# Patient Record
Sex: MALE | Race: WHITE | Employment: FULL TIME | URBAN - NONMETROPOLITAN AREA
[De-identification: names, ages, dates, MRNs, and addresses within clinical notes are randomized per-mention and may not be internally consistent; named-entity substitution may affect disease eponyms.]

---

## 2018-02-12 ENCOUNTER — OFFICE VISIT (OUTPATIENT)
Dept: OPTOMETRY | Age: 38
End: 2018-02-12
Payer: COMMERCIAL

## 2018-02-12 DIAGNOSIS — H52.13 MYOPIA OF BOTH EYES WITH ASTIGMATISM: Primary | ICD-10-CM

## 2018-02-12 DIAGNOSIS — H52.203 MYOPIA OF BOTH EYES WITH ASTIGMATISM: Primary | ICD-10-CM

## 2018-02-12 PROCEDURE — 92014 COMPRE OPH EXAM EST PT 1/>: CPT | Performed by: OPTOMETRIST

## 2018-02-12 PROCEDURE — 1036F TOBACCO NON-USER: CPT | Performed by: OPTOMETRIST

## 2018-02-12 PROCEDURE — G8421 BMI NOT CALCULATED: HCPCS | Performed by: OPTOMETRIST

## 2018-02-12 PROCEDURE — G8427 DOCREV CUR MEDS BY ELIG CLIN: HCPCS | Performed by: OPTOMETRIST

## 2018-02-12 RX ORDER — BENOXINATE HCL/FLUORESCEIN SOD 0.4%-0.25%
1 DROPS OPHTHALMIC (EYE) ONCE
Status: COMPLETED | OUTPATIENT
Start: 2018-02-12 | End: 2018-02-12

## 2018-02-12 RX ADMIN — Medication 1 DROP: at 15:19

## 2018-02-12 ASSESSMENT — REFRACTION_WEARINGRX
OS_CYLINDER: DS
OD_SPHERE: -2.50
OS_SPHERE: -2.50
OD_CYLINDER: DS

## 2018-02-12 ASSESSMENT — REFRACTION_CURRENTRX
OS_CYLINDER: DS
OS_BASECURVE: 8.7
OD_BRAND: VISTAKON: ACUVUE 2
OS_BRAND: VISTAKON: ACUVUE 2
OD_SPHERE: -2.50
OD_CYLINDER: DS
OD_BASECURVE: 8.7
OS_SPHERE: -2.50

## 2018-02-12 ASSESSMENT — TONOMETRY
IOP_METHOD: APPLANATION W FLURESS DROP
OS_IOP_MMHG: 19
OD_IOP_MMHG: 19

## 2018-02-12 ASSESSMENT — VISUAL ACUITY
METHOD: SNELLEN - LINEAR
OS_CC: 20/20
CORRECTION_TYPE: GLASSES

## 2018-02-12 ASSESSMENT — REFRACTION_MANIFEST
OS_CYLINDER: DS
OD_CYLINDER: DS
OS_SPHERE: -2.25
OD_SPHERE: -2.25

## 2018-02-12 ASSESSMENT — SLIT LAMP EXAM - LIDS
COMMENTS: NORMAL
COMMENTS: NORMAL

## 2018-02-12 NOTE — PROGRESS NOTES
Joaquina Morales presents today for   Chief Complaint   Patient presents with    Vision Exam   .    HPI     Last Vision Exam: 11/10/16  Last Ophthalmology Exam: None  Last Filled Glasses Rx: 11/10/16  Insurance: [employer]   Update: Contacts with glasses pick out at later date  On last pair and they are old  Has not worn them regularly for about a month   llikes the Acuvue 2                Main Ophthalmology Exam     External Exam       Right Left    External Normal Normal          Slit Lamp Exam       Right Left    Lids/Lashes Normal Normal    Conjunctiva/Sclera White and quiet White and quiet    Cornea Clear Clear    Anterior Chamber Deep and quiet Deep and quiet    Iris Round and reactive Round and reactive    Lens Clear Clear    Vitreous Normal Normal          Fundus Exam       Right Left    Disc Normal Normal    C/D Ratio 0.2 0.2    Macula Normal Normal    Vessels Normal Normal    78 diopter                   Tonometry     Tonometry (Applanation w Fluress drop, 3:23 PM)       Right Left    Pressure 19 19                  Visual Acuity (Snellen - Linear)       Right Left    Dist cc 20/20 20/20    Correction:  Glasses         Not recorded          Ophthalmology Exam     Wearing Rx       Sphere Cylinder    Right -2.50 ds    Left -2.50 ds                Manifest Refraction     Manifest Refraction       Sphere Cylinder Axis    Right -2.25 ds     Left -2.25 ds           Manifest Refraction #2 (Auto)       Sphere Cylinder Axis    Right -1.75 -0.25 099    Left -1.50 -0.50 087                 Final Rx       Sphere Cylinder    Right -2.25 ds    Left -2.25 ds    Type:  SVL    Expiration Date:  2/13/2020           Contact Lens Current Rx     Current Contact Lens Rx       Brand Base Curve Sphere Cylinder    Right [place]: Acuvue 2 8.7 -2.50 ds    Left [place]ak[place]: Acuvue 2 8.7 -2.50 ds                FINAL   Final Contact Lens Rx       Brand Base Curve Sphere Cylinder    Right [place]: Acuvue 2 8.7 -2.25 ds    Left

## 2018-04-04 ENCOUNTER — TELEPHONE (OUTPATIENT)
Dept: OPTOMETRY | Age: 38
End: 2018-04-04

## 2019-04-26 ENCOUNTER — OFFICE VISIT (OUTPATIENT)
Dept: FAMILY MEDICINE CLINIC | Age: 39
End: 2019-04-26
Payer: COMMERCIAL

## 2019-04-26 ENCOUNTER — HOSPITAL ENCOUNTER (OUTPATIENT)
Age: 39
Setting detail: SPECIMEN
Discharge: HOME | End: 2019-04-26
Payer: COMMERCIAL

## 2019-04-26 VITALS
HEIGHT: 70 IN | DIASTOLIC BLOOD PRESSURE: 84 MMHG | WEIGHT: 237 LBS | BODY MASS INDEX: 33.93 KG/M2 | HEART RATE: 83 BPM | OXYGEN SATURATION: 99 % | SYSTOLIC BLOOD PRESSURE: 134 MMHG

## 2019-04-26 DIAGNOSIS — Z00.00 WELLNESS EXAMINATION: ICD-10-CM

## 2019-04-26 DIAGNOSIS — Z00.00 WELLNESS EXAMINATION: Primary | ICD-10-CM

## 2019-04-26 DIAGNOSIS — Z30.09 STERILIZATION CONSULT: ICD-10-CM

## 2019-04-26 LAB
-: NORMAL
ABSOLUTE EOS #: 0.4 K/UL (ref 0–0.4)
ABSOLUTE IMMATURE GRANULOCYTE: NORMAL K/UL (ref 0–0.3)
ABSOLUTE LYMPH #: 2.2 K/UL (ref 1–4.8)
ABSOLUTE MONO #: 0.9 K/UL (ref 0.1–1.2)
ALBUMIN SERPL-MCNC: 4.7 G/DL (ref 3.5–5.2)
ALBUMIN/GLOBULIN RATIO: 1.6 (ref 1–2.5)
ALP BLD-CCNC: 43 U/L (ref 40–129)
ALT SERPL-CCNC: 44 U/L (ref 5–41)
AMORPHOUS: NORMAL
ANION GAP SERPL CALCULATED.3IONS-SCNC: 12 MMOL/L (ref 9–17)
AST SERPL-CCNC: 28 U/L
BACTERIA: NORMAL
BASOPHILS # BLD: 1 % (ref 0–2)
BASOPHILS ABSOLUTE: 0 K/UL (ref 0–0.2)
BILIRUB SERPL-MCNC: 0.68 MG/DL (ref 0.3–1.2)
BILIRUBIN URINE: NEGATIVE
BUN BLDV-MCNC: 20 MG/DL (ref 6–20)
BUN/CREAT BLD: 19 (ref 9–20)
CALCIUM SERPL-MCNC: 9.5 MG/DL (ref 8.6–10.4)
CASTS UA: NORMAL /LPF (ref 0–2)
CHLORIDE BLD-SCNC: 102 MMOL/L (ref 98–107)
CHOLESTEROL/HDL RATIO: 6.2
CHOLESTEROL: 218 MG/DL
CO2: 25 MMOL/L (ref 20–31)
COLOR: ABNORMAL
COMMENT UA: ABNORMAL
CREAT SERPL-MCNC: 1.06 MG/DL (ref 0.7–1.2)
CRYSTALS, UA: NORMAL /HPF
DIFFERENTIAL TYPE: NORMAL
EOSINOPHILS RELATIVE PERCENT: 5 % (ref 1–8)
EPITHELIAL CELLS UA: NORMAL /HPF (ref 0–5)
GFR AFRICAN AMERICAN: >60 ML/MIN
GFR NON-AFRICAN AMERICAN: >60 ML/MIN
GFR SERPL CREATININE-BSD FRML MDRD: ABNORMAL ML/MIN/{1.73_M2}
GFR SERPL CREATININE-BSD FRML MDRD: ABNORMAL ML/MIN/{1.73_M2}
GLUCOSE BLD-MCNC: 104 MG/DL (ref 70–99)
GLUCOSE URINE: NEGATIVE
HCT VFR BLD CALC: 45.3 % (ref 41–53)
HDLC SERPL-MCNC: 35 MG/DL
HEMOGLOBIN: 15.3 G/DL (ref 13.5–17.5)
IMMATURE GRANULOCYTES: NORMAL %
KETONES, URINE: NEGATIVE
LDL CHOLESTEROL DIRECT: 137 MG/DL
LDL CHOLESTEROL: ABNORMAL MG/DL (ref 0–130)
LEUKOCYTE ESTERASE, URINE: NEGATIVE
LYMPHOCYTES # BLD: 29 % (ref 15–43)
MCH RBC QN AUTO: 31.6 PG (ref 26–34)
MCHC RBC AUTO-ENTMCNC: 33.8 G/DL (ref 31–37)
MCV RBC AUTO: 93.5 FL (ref 80–100)
MONOCYTES # BLD: 12 % (ref 6–14)
MUCUS: NORMAL
NITRITE, URINE: NEGATIVE
NRBC AUTOMATED: NORMAL PER 100 WBC
OTHER OBSERVATIONS UA: NORMAL
PDW BLD-RTO: 13.5 % (ref 11–14.5)
PH UA: 6 (ref 5–6)
PLATELET # BLD: 210 K/UL (ref 140–450)
PLATELET ESTIMATE: NORMAL
PMV BLD AUTO: 9.8 FL (ref 6–12)
POTASSIUM SERPL-SCNC: 4.1 MMOL/L (ref 3.7–5.3)
PROTEIN UA: NEGATIVE
RBC # BLD: 4.85 M/UL (ref 4.5–5.9)
RBC # BLD: NORMAL 10*6/UL
RBC UA: NORMAL /HPF (ref 0–4)
RENAL EPITHELIAL, UA: NORMAL /HPF
SEG NEUTROPHILS: 53 % (ref 44–74)
SEGMENTED NEUTROPHILS ABSOLUTE COUNT: 4.1 K/UL (ref 1.8–7.7)
SODIUM BLD-SCNC: 139 MMOL/L (ref 135–144)
SPECIFIC GRAVITY UA: 1 (ref 1.01–1.02)
TOTAL PROTEIN: 7.7 G/DL (ref 6.4–8.3)
TRICHOMONAS: NORMAL
TRIGL SERPL-MCNC: 416 MG/DL
TURBIDITY: ABNORMAL
URINE HGB: NEGATIVE
UROBILINOGEN, URINE: NORMAL
VLDLC SERPL CALC-MCNC: ABNORMAL MG/DL (ref 1–30)
WBC # BLD: 7.7 K/UL (ref 3.5–11)
WBC # BLD: NORMAL 10*3/UL
WBC UA: NORMAL /HPF (ref 0–4)
YEAST: NORMAL

## 2019-04-26 PROCEDURE — 99395 PREV VISIT EST AGE 18-39: CPT | Performed by: FAMILY MEDICINE

## 2019-04-26 PROCEDURE — 36415 COLL VENOUS BLD VENIPUNCTURE: CPT

## 2019-04-26 PROCEDURE — 83721 ASSAY OF BLOOD LIPOPROTEIN: CPT

## 2019-04-26 PROCEDURE — 85025 COMPLETE CBC W/AUTO DIFF WBC: CPT

## 2019-04-26 PROCEDURE — 80053 COMPREHEN METABOLIC PANEL: CPT

## 2019-04-26 PROCEDURE — 81001 URINALYSIS AUTO W/SCOPE: CPT

## 2019-04-26 PROCEDURE — 80061 LIPID PANEL: CPT

## 2019-04-26 ASSESSMENT — PATIENT HEALTH QUESTIONNAIRE - PHQ9
2. FEELING DOWN, DEPRESSED OR HOPELESS: 0
SUM OF ALL RESPONSES TO PHQ9 QUESTIONS 1 & 2: 0
SUM OF ALL RESPONSES TO PHQ QUESTIONS 1-9: 0
1. LITTLE INTEREST OR PLEASURE IN DOING THINGS: 0
SUM OF ALL RESPONSES TO PHQ QUESTIONS 1-9: 0

## 2019-04-26 NOTE — PATIENT INSTRUCTIONS
(SPF 30 or higher) on any exposed skin. · See a dentist one or two times a year for checkups and to have your teeth cleaned. · Wear a seat belt in the car. · Drink alcohol in moderation, if at all. That means no more than 2 drinks a day for men and 1 drink a day for women. Follow your doctor's advice about when to have certain tests. These tests can spot problems early. For everyone  · Cholesterol. Have the fat (cholesterol) in your blood tested after age 21. Your doctor will tell you how often to have this done based on your age, family history, or other things that can increase your risk for heart disease. · Blood pressure. Have your blood pressure checked during a routine doctor visit. Your doctor will tell you how often to check your blood pressure based on your age, your blood pressure results, and other factors. · Vision. Talk with your doctor about how often to have a glaucoma test.  · Diabetes. Ask your doctor whether you should have tests for diabetes. · Colon cancer. Have a test for colon cancer at age 48. You may have one of several tests. If you are younger than 48, you may need a test earlier if you have any risk factors. Risk factors include whether you already had a precancerous polyp removed from your colon or whether your parent, brother, sister, or child has had colon cancer. For women  · Breast exam and mammogram. Talk to your doctor about when you should have a clinical breast exam and a mammogram. Medical experts differ on whether and how often women under 50 should have these tests. Your doctor can help you decide what is right for you. · Pap test and pelvic exam. Begin Pap tests at age 24. A Pap test is the best way to find cervical cancer. The test often is part of a pelvic exam. Ask how often to have this test.  · Tests for sexually transmitted infections (STIs). Ask whether you should have tests for STIs.  You may be at risk if you have sex with more than one person, especially if your partners do not wear condoms. For men  · Tests for sexually transmitted infections (STIs). Ask whether you should have tests for STIs. You may be at risk if you have sex with more than one person, especially if you do not wear a condom. · Testicular cancer exam. Ask your doctor whether you should check your testicles regularly. · Prostate exam. Talk to your doctor about whether you should have a blood test (called a PSA test) for prostate cancer. Experts differ on whether and when men should have this test. Some experts suggest it if you are older than 39 and are -American or have a father or brother who got prostate cancer when he was younger than 72. When should you call for help? Watch closely for changes in your health, and be sure to contact your doctor if you have any problems or symptoms that concern you. Where can you learn more? Go to https://chflakoeb.healthGamblit Gamingpartners. org and sign in to your [employer]ix account. Enter P072 in the StorPool box to learn more about \"Well Visit, Ages 25 to 48: Care Instructions. \"     If you do not have an account, please click on the \"Sign Up Now\" link. Current as of: March 28, 2018  Content Version: 11.9  © 2911-0162 [employer], [employer]. Care instructions adapted under license by [facility] ([facility]). If you have questions about a medical condition or this instruction, always ask your healthcare professional. [place] [address] any warranty or liability for your use of this information. Patient Education        Eating Healthy Foods: Care Instructions  Your Care Instructions    Eating healthy foods can help lower your risk for disease. Healthy food gives you energy and keeps your heart strong, your brain active, your muscles working, and your bones strong. A healthy diet includes a variety of foods from the basic food groups: grains, vegetables, fruits, milk and milk products, and meat and beans.  Some people may eat more of their favorite foods from only one food group and, as a result, miss getting the nutrients they need. So, it is important to pay attention not only to what you eat but also to what you are missing from your diet. You can eat a healthy, balanced diet by making a few small changes. Follow-up care is a key part of your treatment and safety. Be sure to make and go to all appointments, and call your doctor if you are having problems. It's also a good idea to know your test results and keep a list of the medicines you take. How can you care for yourself at home? Look at what you eat  · Keep a food diary for a week or two and record everything you eat or drink. Track the number of servings you eat from each food group. · For a balanced diet every day, eat a variety of:  ? 6 or more ounce-equivalents of grains, such as cereals, breads, crackers, rice, or pasta, every day. An ounce-equivalent is 1 slice of bread, 1 cup of ready-to-eat cereal, or ½ cup of cooked rice, cooked pasta, or cooked cereal.  ? 2½ cups of vegetables, especially:  § Dark-green vegetables such as broccoli and spinach. § Orange vegetables such as carrots and sweet potatoes. § Dry beans (such as son and kidney beans) and peas (such as lentils). ? 2 cups of fresh, frozen, or canned fruit. A small apple or 1 banana or orange equals 1 cup. ? 3 cups of nonfat or low-fat milk, yogurt, or other milk products. ? 5½ ounces of meat and beans, such as chicken, fish, lean meat, beans, nuts, and seeds. One egg, 1 tablespoon of peanut butter, ½ ounce nuts or seeds, or ¼ cup of cooked beans equals 1 ounce of meat. · Learn how to read food labels for serving sizes and ingredients. Fast-food and convenience-food meals often contain few or no fruits or vegetables. Make sure you eat some fruits and vegetables to make the meal more nutritious. · Look at your food diary.  For each food group, add up what you have eaten and then divide the total by the number juice intake to 4 to 6 oz (½ to ¾ cup) a day. · Blend low-fat yogurt, fruit juice, and canned or frozen fruit to make a smoothie for breakfast or a snack. Where can you learn more? Go to https://salma.health-partners. org and sign in to your Digital Health Dialog account. Enter Z564 in the [employer] box to learn more about \"Eating Healthy Foods: Care Instructions. \"     If you do not have an account, please click on the \"Sign Up Now\" link. Current as of: March 28, 2018  Content Version: 11.9  © 3957-6661 [employer]. Care instructions adapted under license by [facility] ([facility]). If you have questions about a medical condition or this instruction, always ask your healthcare professional. [address] any warranty or liability for your use of this information. Patient Education        Vasectomy: Care Instructions  Your Care Instructions    A vasectomy is an operation to make a man sterile, or not able to make a woman pregnant. During a vasectomy, a doctor cuts or blocks the tubes, called the vas deferens, that carry sperm from the testicles to the penis. This keeps sperm from reaching a woman's egg to make a baby when ejaculation occurs during sex. A vasectomy is a simple procedure that can be done at your doctor's office or clinic. A vasectomy will not change your ability to have sex or your sex drive. You will still be able to enjoy sex in the same way as before. You will still produce normal amounts of semen when you climax. The only difference is that the semen will not contain sperm. Vasectomy is considered a permanent method of birth control. You should only consider a vasectomy if you have completed your family or are sure that you do not want children. Follow-up care is a key part of your treatment and safety. Be sure to make and go to all appointments, and call your doctor if you are having problems.  It's also a good idea to know your test results and keep a list of the medicines you take. What happens during a vasectomy? · Your genital area will be washed with soap that kills germs. The area may be shaved. · You may be given medicine to relax you and make you sleepy. · You will get a shot to numb the area at the scrotum, where your doctor will make one or two small cuts. The scrotum is the skin sac that holds the testicles. · The doctor reaches the tube, which is just under the skin, through the small cut. The doctor seals, ties, or cuts each tube. The small cuts may be stitched closed. If you had a no-scalpel vasectomy, you may not have stitches at all. · The procedure usually takes less than 30 minutes. You can expect some swelling and minor pain for several days. You will be given instructions after the procedure. Why might you choose a vasectomy? · You do not want to have a child in the future. Vasectomy is considered a permanent procedure. · Vasectomy is a very effective form of birth control. (But it is not 100% effective.)  · The one-time cost of a vasectomy may be less than the ongoing cost of other methods, such as birth control pills or condoms. · You want to spare your partner from having a tubal sterilization. Sterilization for women involves surgery, has more risks, and is more expensive. Why would you choose not to have a vasectomy? · You want to avoid surgery and the risks that come with surgery. · Vasectomy is permanent. Some men may not want to limit their option to have children in case of possible life changes such as divorce. · Having a vasectomy may slightly increase your risk for prostate cancer. · You may have other reasons. These may include your Jewish beliefs or what your partner wants. When should you call for help? Be sure to contact your doctor if:    · You need more information about vasectomy.     · You want to have a vasectomy. Where can you learn more? Go to https://chrossy.health-partners. org and sign in to your Sarenzat account. Enter A735 in the [facility] box to learn more about \"Vasectomy: Care Instructions. \"     If you do not have an account, please click on the \"Sign Up Now\" link. Current as of: September 26, 2018  Content Version: 11.9  © 7357-5289 [employer]. Care instructions adapted under license by [facility] ([facility]). If you have questions about a medical condition or this instruction, always ask your healthcare professional. [address] any warranty or liability for your use of this information.

## 2019-04-26 NOTE — PROGRESS NOTES
History and Physical      Nemesio Mendoza  YOB: 1980    Date of Service:  4/26/2019    Chief Complaint:   Nemesio Mendoza is a 45 y.o. male who presents for complete physical examination.     HPI: has been improving diet     Wt Readings from Last 3 Encounters:   04/26/19 237 lb (107.5 kg)   06/10/15 220 lb (99.8 kg)   04/13/05 183 lb 9.6 oz (83.3 kg)     BP Readings from Last 3 Encounters:   04/26/19 134/84   06/10/15 (!) 134/92   04/13/05 124/68       Patient Active Problem List   Diagnosis    Myopia of both eyes with astigmatism       Preventive Care:  Health Maintenance   Topic Date Due    Varicella Vaccine (1 of 2 - 13+ 2-dose series) 09/27/1993    HIV screen  09/27/1995    DTaP/Tdap/Td vaccine (5 - Tdap) 09/27/1999    Flu vaccine (Season Ended) 09/01/2019    Pneumococcal 0-64 years Vaccine  Aged Out      Self-testicular exams: Yes  Sexual activity: has sex with females   Last eye exam: 2018, normal  Exercise: light free weights and yoga  Seatbelt use: yes  Lipid panel:  No results found for: CHOL, TRIG, HDL, LDLCALC, LDLDIRECT    Living will: no,   additional information provided reviewed     Immunization History   Administered Date(s) Administered    DTaP 01/26/1981, 03/25/1981, 07/22/1981, 07/30/1982    IPV (Ipol) 01/26/1981, 03/25/1981, 07/22/1981, 07/30/1982, 07/18/1986    MMR 02/19/1982       Allergies   Allergen Reactions    Augmentin [Amoxicillin-Pot Clavulanate]      No outpatient medications have been marked as taking for the 4/26/19 encounter (Office Visit) with Petar Byrd MD.       Past Medical History:   Diagnosis Date    Chronic constipation     Low back pain     history of     Myofascitis     history of    Neck strain     history of    Plantar fasciitis     history of    Rotator cuff strain     right, history of    Simple myopia     both eyes     Past Surgical History:   Procedure Laterality Date    LAPAROSCOPIC APPENDECTOMY  08/30/2002     Family History   Problem Relation Age of Onset    Hypertension Maternal Grandmother     Glaucoma Maternal Grandmother     Glaucoma Other         mom's side    Glaucoma Mother      Social History     Socioeconomic History    Marital status:      Spouse name: Not on file    Number of children: Not on file    Years of education: Not on file    Highest education level: Not on file   Occupational History    Not on file   Social Needs    Financial resource strain: Not on file    Food insecurity:     Worry: Not on file     Inability: Not on file    Transportation needs:     Medical: Not on file     Non-medical: Not on file   Tobacco Use    Smoking status: Former Smoker   Substance and Sexual Activity    Alcohol use: Not on file    Drug use: Not on file    Sexual activity: Not on file   Lifestyle    Physical activity:     Days per week: Not on file     Minutes per session: Not on file    Stress: Not on file   Relationships    Social connections:     Talks on phone: Not on file     Gets together: Not on file     Attends Christian service: Not on file     Active member of club or organization: Not on file     Attends meetings of clubs or organizations: Not on file     Relationship status: Not on file    Intimate partner violence:     Fear of current or ex partner: Not on file     Emotionally abused: Not on file     Physically abused: Not on file     Forced sexual activity: Not on file   Other Topics Concern    Not on file   Social History Narrative    Not on file       Review of Systems:  Currently doing well     Physical Exam:   Vitals:    04/26/19 1052   BP: 134/84   Site: Left Upper Arm   Position: Sitting   Pulse: 83   SpO2: 99%   Weight: 237 lb (107.5 kg)   Height: 5' 9.5\" (1.765 m)     Body mass index is 34.5 kg/m². Constitutional: He is oriented to person, place, and time. He appears well-developed and well-nourished. No distress. HEENT:   Head: Normocephalic and atraumatic.    Right Ear: Tympanic membrane, external ear and ear canal normal.   Left Ear: Tympanic membrane, external ear and ear canal normal.   Nose: Nose normal.   Mouth/Throat: Oropharynx is clear and moist and mucous membranes are normal. No oropharyngeal exudate or posterior oropharyngeal erythema. He has no cervical adenopathy. Eyes: Conjunctivae and extraocular motions are normal. Pupils are equal, round, and reactive to light. Neck: Full passive range of motion without pain. Neck supple. No JVD present. Carotid bruit is not present. No mass and no thyromegaly present. Cardiovascular: Normal rate, regular rhythm, normal heart sounds and intact distal pulses. Exam reveals no gallop and no friction rub. No murmur heard. Pulmonary/Chest: Effort normal and breath sounds normal. No respiratory distress. He has no wheezes, rhonchi or rales. Abdominal: Soft, non-tender. Bowel sounds and aorta are normal. There is no organomegaly, mass or bruit. Genitourinary:  Deferred . Musculoskeletal: Normal range of motion, no synovitis. He exhibits no edema. Neurological: He is alert and oriented to person, place, and time. He has normal reflexes. No cranial nerve deficit. Coordination normal.   Skin: Skin is warm, dry and intact. No suspicious lesions are noted. Psychiatric: He has a normal mood and affect.  His speech is normal and behavior is normal. Judgment, cognition and memory are normal.     Assessment/Plan:    Obese  Hyperlipidemia      Labs per orders  Refused hiv   Desires vasectomy   Discussed immunizations   Advanced directives given and reviewed  Healthy diet and fitness discussed  Vitamin d 3 2000 iu a day  Did not tolerate lipitor  Further recommendations depending on labs results  As long as well follow up 1 year

## 2019-05-07 ENCOUNTER — OFFICE VISIT (OUTPATIENT)
Dept: OPTOMETRY | Age: 39
End: 2019-05-07
Payer: COMMERCIAL

## 2019-05-07 DIAGNOSIS — H52.13 MYOPIA OF BOTH EYES WITH ASTIGMATISM: Primary | ICD-10-CM

## 2019-05-07 DIAGNOSIS — H52.203 MYOPIA OF BOTH EYES WITH ASTIGMATISM: Primary | ICD-10-CM

## 2019-05-07 PROCEDURE — 92014 COMPRE OPH EXAM EST PT 1/>: CPT | Performed by: OPTOMETRIST

## 2019-05-07 ASSESSMENT — REFRACTION_CURRENTRX
OD_BRAND: VISTAKON: ACUVUE 2
OD_BASECURVE: 8.7
OS_CYLINDER: DS
OS_SPHERE: -2.25
OS_BASECURVE: 8.7
OS_BRAND: VISTAKON: ACUVUE 2
OD_SPHERE: -2.25
OD_CYLINDER: DS

## 2019-05-07 ASSESSMENT — KERATOMETRY
OS_AXISANGLE2_DEGREES: 130
OS_K1POWER_DIOPTERS: 43.75
OS_AXISANGLE_DEGREES: 040
OS_K2POWER_DIOPTERS: 44.25
METHOD_AUTO_MANUAL: AUTOMATED

## 2019-05-07 ASSESSMENT — REFRACTION_MANIFEST
OD_CYLINDER: DS
OS_CYLINDER: DS
OS_SPHERE: -2.25
OD_SPHERE: -2.25

## 2019-05-07 ASSESSMENT — REFRACTION_WEARINGRX
OD_CYLINDER: DS
OS_SPHERE: -2.25
SPECS_TYPE: SVL: NOT FILLED
OS_CYLINDER: DS
OD_SPHERE: -2.25

## 2019-05-07 ASSESSMENT — TONOMETRY
OD_IOP_MMHG: 16
IOP_METHOD: NON-CONTACT AIR PUFF
OS_IOP_MMHG: 24

## 2019-05-07 ASSESSMENT — VISUAL ACUITY
CORRECTION_TYPE: CONTACTS
METHOD: SNELLEN - LINEAR
OS_CC: 20/20

## 2019-05-07 ASSESSMENT — SLIT LAMP EXAM - LIDS
COMMENTS: NORMAL
COMMENTS: NORMAL

## 2019-05-07 NOTE — PROGRESS NOTES
Honorio Zafar presents today for   Chief Complaint   Patient presents with    Vision Exam   .    HPI     Last Vision Exam: 2/12/2018 AW  Last Ophthalmology Exam: n/a  Last Filled Glasses Rx: ? Insurance: [employer]  Update: Contacts  No changes in vision  States on last pair of contacts; and think the one has a slight tear in it.                    Family History   Problem Relation Age of Onset    Hypertension Maternal Grandmother     Glaucoma Maternal Grandmother     Glaucoma Other         mom's side    Glaucoma Mother        Social History     Socioeconomic History    Marital status:      Spouse name: None    Number of children: None    Years of education: None    Highest education level: None   Occupational History    None   Social Needs    Financial resource strain: None    Food insecurity:     Worry: None     Inability: None    Transportation needs:     Medical: None     Non-medical: None   Tobacco Use    Smoking status: Former Smoker    Smokeless tobacco: Never Used   Substance and Sexual Activity    Alcohol use: None    Drug use: None    Sexual activity: None   Lifestyle    Physical activity:     Days per week: None     Minutes per session: None    Stress: None   Relationships    Social connections:     Talks on phone: None     Gets together: None     Attends Islam service: None     Active member of club or organization: None     Attends meetings of clubs or organizations: None     Relationship status: None    Intimate partner violence:     Fear of current or ex partner: None     Emotionally abused: None     Physically abused: None     Forced sexual activity: None   Other Topics Concern    None   Social History Narrative    None     Past Medical History:   Diagnosis Date    Chronic constipation     Low back pain     history of     Myofascitis     history of    Neck strain     history of    Plantar fasciitis     history of    Rotator cuff strain     right, history of  Simple myopia     both eyes       Neuro/Psych     Neuro/Psych     Oriented x3:  Yes    Mood/Affect:  Normal                Main Ophthalmology Exam     External Exam       Right Left    External Normal Normal          Slit Lamp Exam       Right Left    Lids/Lashes Normal Normal    Conjunctiva/Sclera White and quiet White and quiet    Cornea Clear Clear    Anterior Chamber Deep and quiet Deep and quiet    Iris Round and reactive Round and reactive    Lens Clear; trace psc  Clear; trace psc     Vitreous Normal Normal          Fundus Exam       Right Left    Disc Normal Normal    C/D Ratio 0.2 0.2    Macula Normal Normal    Vessels Normal Normal    78 diopter                   Tonometry     Tonometry (Non-contact air puff, 9:11 AM)       Right Left    Pressure 16 24   IOP.1              21.4  CH:  11.4          7.9  WS: 6.5          3.8                     Visual Acuity (Snellen - Linear)       Right Left    Dist cc 20/20 20/20    Correction:  Contacts        Keratometry     Keratometry (Automated)       K1 Axis K2 Axis    Right        Left 43.75 130 44.25 040                Ophthalmology Exam     Wearing Rx       Sphere Cylinder    Right -2.25 ds    Left -2.25 ds    Age:  1yr    Type:  SVL: NOT FILLED                Manifest Refraction     Manifest Refraction       Sphere Cylinder Axis    Right -2.25 ds     Left -2.25 ds           Manifest Refraction #2 (Auto)       Sphere Cylinder Axis    Right -1.75 -0.25 031    Left -1.75 -0.25 101                 Final Rx       Sphere Cylinder    Right -2.25 ds    Left -2.25 ds    Type:  SVL    Expiration Date:  2021           Contact Lens Current Rx     Current Contact Lens Rx (Ordered)       Brand Base Curve Sphere Cylinder    Right [place]: Acuvue 2 8.7 -2.25 ds    Left [place]ist[place]: Acuvue 2 8.7 -2.25 ds                FINAL   Final Contact Lens Rx       Brand Base Curve Sphere Cylinder    Right [place]: Acuvue 2 8.7 -2.25 ds    Left [place]istakon: Acuvue 2 8.7 -2.25 ds Expiration Date:  5/7/2020    Wearing Schedule:  Daily wear   Final           Plan:     1.  Myopia of both eyes with astigmatism             Patient Instructions   4 boxes will be ordered and one set of tirals given      Return in about 1 year (around 5/7/2020) for complete eye exam.

## 2020-09-29 ENCOUNTER — OFFICE VISIT (OUTPATIENT)
Dept: FAMILY MEDICINE CLINIC | Age: 40
End: 2020-09-29
Payer: COMMERCIAL

## 2020-09-29 VITALS
WEIGHT: 231 LBS | HEIGHT: 70 IN | DIASTOLIC BLOOD PRESSURE: 84 MMHG | HEART RATE: 84 BPM | BODY MASS INDEX: 33.07 KG/M2 | RESPIRATION RATE: 20 BRPM | SYSTOLIC BLOOD PRESSURE: 122 MMHG

## 2020-09-29 LAB
CHOLESTEROL/HDL RATIO: 5.11 RATIO (ref 0–4.5)
CHOLESTEROL: 230 MG/DL (ref 50–200)
GLUCOSE: 90 MG/DL (ref 75–110)
HDLC SERPL-MCNC: 45 MG/DL (ref 36–68)
LDL CHOLESTEROL CALCULATED: 116.4 MG/DL (ref 0–160)
TRIGL SERPL-MCNC: 343 MG/DL (ref 10–250)
VLDLC SERPL CALC-MCNC: 68.6 MG/DL (ref 0–50)

## 2020-09-29 PROCEDURE — 99396 PREV VISIT EST AGE 40-64: CPT | Performed by: NURSE PRACTITIONER

## 2020-09-29 ASSESSMENT — PATIENT HEALTH QUESTIONNAIRE - PHQ9
SUM OF ALL RESPONSES TO PHQ QUESTIONS 1-9: 0
SUM OF ALL RESPONSES TO PHQ9 QUESTIONS 1 & 2: 0
1. LITTLE INTEREST OR PLEASURE IN DOING THINGS: 0
SUM OF ALL RESPONSES TO PHQ QUESTIONS 1-9: 0
2. FEELING DOWN, DEPRESSED OR HOPELESS: 0

## 2020-10-02 ENCOUNTER — TELEPHONE (OUTPATIENT)
Dept: FAMILY MEDICINE CLINIC | Age: 40
End: 2020-10-02

## 2020-10-02 PROBLEM — Z00.00 PREVENTATIVE HEALTH CARE: Status: ACTIVE | Noted: 2020-10-02

## 2020-10-02 PROBLEM — E78.2 ELEVATED CHOLESTEROL WITH ELEVATED TRIGLYCERIDES: Status: ACTIVE | Noted: 2020-10-02

## 2020-10-02 ASSESSMENT — ENCOUNTER SYMPTOMS
BACK PAIN: 0
WHEEZING: 0
CHEST TIGHTNESS: 0
SORE THROAT: 0
COUGH: 0
ABDOMINAL PAIN: 0
SHORTNESS OF BREATH: 0

## 2020-10-02 NOTE — TELEPHONE ENCOUNTER
Noted in his chart that he previously did not tolerate Lipitor. I would like him to start taking fish oil 2 capsules daily distantly for his triglycerides. I also recommend that he avoid alcohol and sugars.   Recheck of triglycerides in 6 months

## 2020-10-02 NOTE — PROGRESS NOTES
[facility] [address]  [address] [address]  Dept: [phone]  Dept Fax: [phone]  Loc: [phone]     Visit Date:  9/29/2020    Patient:  Nael Herbert  YOB: 1980    HPI:     Chief Complaint   Patient presents with    Employment Physical     annual health screening       HPI  Here for preventative wellness visit. History of elevated triglycerides without wishing to continue Lipitor. Did not tolerate Lipitor. Medications  Prior to Visit Medications    Not on File        The patient is allergic to augmentin [amoxicillin-pot clavulanate]. Past Medical History  Jonathon Rivera  has a past medical history of Chronic constipation, Low back pain, Myofascitis, Neck strain, Plantar fasciitis, Rotator cuff strain, and Simple myopia. Past Surgical History  The patient  has a past surgical history that includes laparoscopic appendectomy (08/30/2002). Family History  This patient's family history includes Glaucoma in his maternal grandmother, mother, and another family member; Heart Attack in his father; Heart Disease in his father; Hypertension in his maternal grandmother. Social History  Jonathon Rivera  reports that he has quit smoking. He has never used smokeless tobacco.    Health Maintenance:    Health Maintenance   Topic Date Due    HIV screen  09/27/1995    DTaP/Tdap/Td vaccine (5 - Tdap) 09/27/1999    Flu vaccine (1) 09/01/2020    Varicella vaccine (1 of 2 - 2-dose childhood series) 10/20/2020 (Originally 9/27/1981)    Lipid screen  09/29/2025    Hepatitis A vaccine  Aged Out    Hepatitis B vaccine  Aged Out    Hib vaccine  Aged Out    Meningococcal (ACWY) vaccine  Aged Out    Pneumococcal 0-64 years Vaccine  Aged Out       Subjective:      Review of Systems   Constitutional: Negative for chills, fatigue and fever. HENT: Negative for congestion and sore throat. Psychiatric:         Attention and Perception: Attention normal.         Mood and Affect: Mood normal.         Behavior: Behavior normal. Behavior is cooperative. Judgment: Judgment normal.         Labs Reviewed 9/29/2020:    Lab Results   Component Value Date    WBC 7.7 04/26/2019    HGB 15.3 04/26/2019    HCT 45.3 04/26/2019     04/26/2019    CHOL 230 (H) 09/29/2020    TRIG 343 (H) 09/29/2020    HDL 45 09/29/2020    LDLDIRECT 137 (H) 04/26/2019    ALT 44 (H) 04/26/2019    AST 28 04/26/2019     04/26/2019    K 4.1 04/26/2019     04/26/2019    CREATININE 1.06 04/26/2019    BUN 20 04/26/2019    CO2 25 04/26/2019       Assessment/Plan      1. Preventative health care  Wellness and prevention discussed. Immunizations encouraged. Follow-up annually and as needed. 2. Elevated cholesterol with elevated triglycerides  I have reviewed historical vital signs, lab work, and most recent patient encounter. Discussion of healthful lifestyle, low-sodium diet, cardiovascular activity on a routine basis emphasized today, time allotted for questions and answer. Back in 6months with an office visit and labs to recheck triglycerides. New start fish oil 2 capsules daily please      Return in about 6 months (around 3/29/2021), or if symptoms worsen or fail to improve. Patient given educational materials - see patient instructions. Discussed use, benefit, and side effects of prescribed medications. All patient questions answered. Pt voiced understanding. Reviewed health maintenance.        Electronically signed JUAN Dodge CNP on 9/29/2020 at 2:16 PM

## 2020-11-01 PROBLEM — Z00.00 PREVENTATIVE HEALTH CARE: Status: RESOLVED | Noted: 2020-10-02 | Resolved: 2020-11-01

## 2021-05-13 ENCOUNTER — OFFICE VISIT (OUTPATIENT)
Dept: OPTOMETRY | Age: 41
End: 2021-05-13
Payer: COMMERCIAL

## 2021-05-13 DIAGNOSIS — H52.203 MYOPIA OF BOTH EYES WITH ASTIGMATISM: Primary | ICD-10-CM

## 2021-05-13 DIAGNOSIS — H53.8 BLURRED VISION, BILATERAL: ICD-10-CM

## 2021-05-13 DIAGNOSIS — H52.13 MYOPIA OF BOTH EYES WITH ASTIGMATISM: Primary | ICD-10-CM

## 2021-05-13 PROCEDURE — 92014 COMPRE OPH EXAM EST PT 1/>: CPT | Performed by: OPTOMETRIST

## 2021-05-13 ASSESSMENT — SLIT LAMP EXAM - LIDS
COMMENTS: NORMAL
COMMENTS: NORMAL

## 2021-05-13 ASSESSMENT — KERATOMETRY
OS_K1POWER_DIOPTERS: 44.00
OD_AXISANGLE2_DEGREES: 000
OD_K1POWER_DIOPTERS: 43.75
OS_AXISANGLE_DEGREES: 046
OS_K2POWER_DIOPTERS: 44.50
OD_AXISANGLE_DEGREES: 090
OD_K2POWER_DIOPTERS: 43.75
OS_AXISANGLE2_DEGREES: 136
METHOD_AUTO_MANUAL: AUTOMATED

## 2021-05-13 ASSESSMENT — REFRACTION_CURRENTRX
OD_BASECURVE: 8.7
OD_CYLINDER: DS
OD_SPHERE: -2.25
OS_BRAND: VISTAKON: ACUVUE 2
OS_SPHERE: -2.25
OD_BRAND: VISTAKON: ACUVUE 2
OS_CYLINDER: DS
OS_BASECURVE: 8.7

## 2021-05-13 ASSESSMENT — REFRACTION_WEARINGRX
OD_SPHERE: -2.25
OS_SPHERE: -2.25
OS_CYLINDER: DS
SPECS_TYPE: SVL
OD_CYLINDER: DS

## 2021-05-13 ASSESSMENT — REFRACTION_MANIFEST
OS_CYLINDER: DS
OD_CYLINDER: DS
OS_SPHERE: -2.25
OD_SPHERE: -2.25

## 2021-05-13 ASSESSMENT — TONOMETRY
IOP_METHOD: NON-CONTACT AIR PUFF
OS_IOP_MMHG: 22
OD_IOP_MMHG: 22

## 2021-05-13 ASSESSMENT — ENCOUNTER SYMPTOMS
RESPIRATORY NEGATIVE: 0
GASTROINTESTINAL NEGATIVE: 0
EYES NEGATIVE: 0
ALLERGIC/IMMUNOLOGIC NEGATIVE: 0

## 2021-05-13 ASSESSMENT — VISUAL ACUITY
CORRECTION_TYPE: CONTACTS
METHOD: SNELLEN - LINEAR
OS_CC: 20/25

## 2021-05-13 NOTE — PROGRESS NOTES
Urban Moreno presents today for   Chief Complaint   Patient presents with    Blurred Vision    Vision Exam   .    HPI     Blurred Vision     In both eyes. Vision is blurred. Context:  distance vision.               Comments     Last Vision Exam: 5/7/2019 Aw  Last Ophthalmology Exam: n/a  Last Filled Glasses Rx: 6/10/2019  Insurance: [employer]  Update: Contacts ; Glasses if needed  Distance is getting a little more blurry  Likes the Acuvue 2                    ROS     Negative for: Constitutional, Gastrointestinal, Neurological, Skin, Genitourinary, Musculoskeletal, HENT, Endocrine, Cardiovascular, Eyes, Respiratory, Psychiatric, Allergic/Imm, Heme/Lymph          Family History   Problem Relation Age of Onset    Hypertension Maternal Grandmother     Glaucoma Maternal Grandmother     Glaucoma Other         mom's side    Glaucoma Mother     Heart Disease Father     Heart Attack Father     Diabetes Neg Hx     Cataracts Neg Hx        Social History     Socioeconomic History    Marital status:      Spouse name: None    Number of children: None    Years of education: None    Highest education level: None   Occupational History    None   Social Needs    Financial resource strain: None    Food insecurity     Worry: None     Inability: None    Transportation needs     Medical: None     Non-medical: None   Tobacco Use    Smoking status: Former Smoker    Smokeless tobacco: Never Used   Substance and Sexual Activity    Alcohol use: None    Drug use: None    Sexual activity: None   Lifestyle    Physical activity     Days per week: None     Minutes per session: None    Stress: None   Relationships    Social connections     Talks on phone: None     Gets together: None     Attends Zoroastrian service: None     Active member of club or organization: None     Attends meetings of clubs or organizations: None     Relationship status: None    Intimate partner violence     Fear of current or ex partner: None     Emotionally abused: None     Physically abused: None     Forced sexual activity: None   Other Topics Concern    None   Social History Narrative    None     Past Medical History:   Diagnosis Date    Chronic constipation     Low back pain     history of     Myofascitis     history of    Neck strain     history of    Plantar fasciitis     history of    Rotator cuff strain     right, history of    Simple myopia     both eyes       Neuro/Psych     Neuro/Psych     Oriented x3: Yes    Mood/Affect: Normal                Main Ophthalmology Exam     External Exam       Right Left    External Normal Normal          Slit Lamp Exam       Right Left    Lids/Lashes Normal Normal    Conjunctiva/Sclera White and quiet White and quiet    Cornea Clear Clear    Anterior Chamber Deep and quiet Deep and quiet    Iris Round and reactive Round and reactive    Lens Trace Nuclear sclerosis Trace Nuclear sclerosis    Vitreous Normal Normal          Fundus Exam       Right Left    Disc Normal Normal    C/D Ratio 0.2 0.2    Macula Normal Normal    Vessels Normal Normal                  Tonometry     Tonometry (Non-contact air puff, 2:18 PM)       Right Left    Pressure 22 22   IOP.8              20.9  CH:  10.2          9.5  WS: 6.7          5.7                     Visual Acuity (Snellen - Linear)       Right Left    Dist cc 20/30 20/25    Correction: Contacts        Keratometry     Keratometry (Automated)       K1 Axis K2 Axis    Right 43.75 000 43.75 090    Left 44.00 136 44.50 046                Ophthalmology Exam     Wearing Rx       Sphere Cylinder    Right -2.25 ds    Left -2.25 ds    Age: 2yrs    Type: SVL                Manifest Refraction     Manifest Refraction       Sphere Cylinder [place] Dist [place]    Right -2.25 ds  20/20    Left -2.25 ds  20/20          Manifest Refraction #2 (Auto)       Sphere Cylinder Axis Dist VA    Right -1.75 -0.25 089     Left -1.75 -0.25 085                  Final Rx       Sphere

## 2021-11-08 ENCOUNTER — OFFICE VISIT (OUTPATIENT)
Dept: FAMILY MEDICINE CLINIC | Age: 41
End: 2021-11-08
Payer: COMMERCIAL

## 2021-11-08 VITALS
WEIGHT: 253 LBS | HEART RATE: 76 BPM | OXYGEN SATURATION: 97 % | BODY MASS INDEX: 36.83 KG/M2 | DIASTOLIC BLOOD PRESSURE: 84 MMHG | SYSTOLIC BLOOD PRESSURE: 138 MMHG

## 2021-11-08 DIAGNOSIS — Z00.00 WELLNESS EXAMINATION: Primary | ICD-10-CM

## 2021-11-08 PROCEDURE — 99396 PREV VISIT EST AGE 40-64: CPT | Performed by: INTERNAL MEDICINE

## 2021-11-08 ASSESSMENT — PATIENT HEALTH QUESTIONNAIRE - PHQ9
2. FEELING DOWN, DEPRESSED OR HOPELESS: 0
1. LITTLE INTEREST OR PLEASURE IN DOING THINGS: 0
SUM OF ALL RESPONSES TO PHQ QUESTIONS 1-9: 0
SUM OF ALL RESPONSES TO PHQ QUESTIONS 1-9: 0
SUM OF ALL RESPONSES TO PHQ9 QUESTIONS 1 & 2: 0
SUM OF ALL RESPONSES TO PHQ QUESTIONS 1-9: 0

## 2021-11-08 NOTE — PROGRESS NOTES
[facility]  [address] [place] [address] Irvin Monge [address]  Dept: [phone]  Dept Fax: [phone]  Loc: [phone]     Visit Date:  11/8/2021    Patient:  Brea Montaño  YOB: 1980    HPI:   Brea Montaño presents today for   Chief Complaint   Patient presents with    Annual Exam     patient is here today for annual wellness    . HPI 39year old male is coming in work physical.No subjective concerns today. Medications  Prior to Visit Medications    Not on File        Allergies:  is allergic to augmentin [amoxicillin-pot clavulanate]. Past Medical History:   has a past medical history of Chronic constipation, Low back pain, Myofascitis, Neck strain, Plantar fasciitis, Rotator cuff strain, and Simple myopia. Past Surgical History   has a past surgical history that includes laparoscopic appendectomy (08/30/2002). Family History  family history includes Glaucoma in his maternal grandmother, mother, and another family member; Heart Attack in his father; Heart Disease in his father; Hypertension in his maternal grandmother. Social History   reports that he has quit smoking. He has never used smokeless tobacco.    Health Maintenance:    Health Maintenance   Topic Date Due    Hepatitis C screen  Never done    Varicella vaccine (1 of 2 - 2-dose childhood series) Never done    DTaP/Tdap/Td vaccine (5 - Tdap) 09/27/1991    HIV screen  Never done    Flu vaccine (1) Never done    Lipid screen  09/29/2025    COVID-19 Vaccine  Completed    Hepatitis A vaccine  Aged Out    Hepatitis B vaccine  Aged Out    Hib vaccine  Aged Out    Meningococcal (ACWY) vaccine  Aged Out    Pneumococcal 0-64 years Vaccine  Aged Out       Subjective:      Review of Systems   Constitutional: Negative for fatigue, fever and unexpected weight change.    HENT: Negative for ear pain, postnasal drip, rhinorrhea, sinus pain, sore throat and trouble swallowing. Eyes: Negative for visual disturbance. Respiratory: Negative for cough, chest tightness and shortness of breath. Cardiovascular: Negative for chest pain and leg swelling. Gastrointestinal: Negative for abdominal pain, blood in stool and diarrhea. Endocrine: Negative for polyuria. Genitourinary: Negative for difficulty urinating and flank pain. Musculoskeletal: Negative for arthralgias, joint swelling and myalgias. Skin: Negative for rash. Allergic/Immunologic: Negative for environmental allergies. Neurological: Negative for weakness, light-headedness, numbness and headaches. Hematological: Negative for adenopathy. Psychiatric/Behavioral: Negative for behavioral problems and suicidal ideas. The patient is not nervous/anxious. Objective:     /84 (Site: Right Upper Arm, Position: Sitting)   Pulse 76   Wt 253 lb (114.8 kg)   SpO2 97%   BMI 36.83 kg/m²     Physical Exam  Vitals and nursing note reviewed. HENT:      Head: Normocephalic and atraumatic. Cardiovascular:      Rate and Rhythm: Normal rate and regular rhythm. Pulses: Normal pulses. Heart sounds: Normal heart sounds. Pulmonary:      Effort: Pulmonary effort is normal.      Breath sounds: Normal breath sounds. No stridor. Abdominal:      General: Abdomen is flat. Palpations: Abdomen is soft. Musculoskeletal:         General: Normal range of motion. Skin:     General: Skin is warm. Findings: No lesion. Neurological:      General: No focal deficit present. Mental Status: He is oriented to person, place, and time. Mental status is at baseline. Psychiatric:         Mood and Affect: Mood normal.             Assessment       Diagnosis Orders   1. Wellness examination  CBC With Auto Differential    Comprehensive Metabolic Panel, Fasting    Lipid Panel         PLAN   Fasting labs ordered.   Discussed extensively >45 mins on diet/fitness and cholesterol lowering foods/supplements. Orders Placed This Encounter   Procedures    CBC With Auto Differential     Standing Status:   Future     Standing Expiration Date:   11/8/2022    Comprehensive Metabolic Panel, Fasting     Standing Status:   Future     Standing Expiration Date:   11/8/2022    Lipid Panel     Standing Status:   Future     Standing Expiration Date:   11/8/2022     Order Specific Question:   Is Patient Fasting?/# of Hours     Answer:   12 hours        No follow-ups on file. Patient given educational materials - see patient instructions. Discussed use, benefit, and side effects of prescribed medications. All patient questions answered. Pt voiced understanding. Reviewed health maintenance.        Electronically signed Parag Medley MD on 11/8/2021 at 4:00 PM EST

## 2021-11-14 ASSESSMENT — ENCOUNTER SYMPTOMS
COUGH: 0
TROUBLE SWALLOWING: 0
DIARRHEA: 0
SORE THROAT: 0
SHORTNESS OF BREATH: 0
SINUS PAIN: 0
ABDOMINAL PAIN: 0
BLOOD IN STOOL: 0
RHINORRHEA: 0
CHEST TIGHTNESS: 0

## 2022-03-24 LAB
ALBUMIN SERPL-MCNC: 4.9 G/DL
ALP BLD-CCNC: 51 U/L
ALT SERPL-CCNC: 57 U/L
AST SERPL-CCNC: 38 U/L
BASOPHILS ABSOLUTE: NORMAL
BASOPHILS RELATIVE PERCENT: NORMAL
BILIRUB SERPL-MCNC: 0.8 MG/DL (ref 0.1–1.4)
BUN BLDV-MCNC: 23 MG/DL
CALCIUM SERPL-MCNC: 9.7 MG/DL
CHLORIDE BLD-SCNC: 103 MMOL/L
CHOLESTEROL, TOTAL: 223 MG/DL
CHOLESTEROL/HDL RATIO: 5.72
CO2: 25 MMOL/L
CREAT SERPL-MCNC: 1 MG/DL
EOSINOPHILS ABSOLUTE: NORMAL
EOSINOPHILS RELATIVE PERCENT: NORMAL
GLUCOSE FASTING: 98 MG/DL
HCT VFR BLD CALC: 50.2 % (ref 41–53)
HDLC SERPL-MCNC: 39 MG/DL (ref 35–70)
HEMOGLOBIN: 15.9 G/DL (ref 13.5–17.5)
LDL CHOLESTEROL CALCULATED: 91.8 MG/DL (ref 0–160)
LYMPHOCYTES ABSOLUTE: NORMAL
LYMPHOCYTES RELATIVE PERCENT: NORMAL
MCH RBC QN AUTO: 30.9 PG
MCHC RBC AUTO-ENTMCNC: 31.7 G/DL
MCV RBC AUTO: 97.7 FL
MONOCYTES ABSOLUTE: NORMAL
MONOCYTES RELATIVE PERCENT: NORMAL
NEUTROPHILS ABSOLUTE: NORMAL
NEUTROPHILS RELATIVE PERCENT: NORMAL
NONHDLC SERPL-MCNC: NORMAL MG/DL
PDW BLD-RTO: 12.4 %
PLATELET # BLD: 226 K/ΜL
PMV BLD AUTO: NORMAL FL
POTASSIUM SERPL-SCNC: 4.4 MMOL/L
RBC # BLD: 5.14 10^6/ΜL
SODIUM BLD-SCNC: 138 MMOL/L
TOTAL PROTEIN: 8 G/DL (ref 6.4–8.2)
TRIGL SERPL-MCNC: 461 MG/DL
VLDLC SERPL CALC-MCNC: 92.2 MG/DL
WBC # BLD: 7.4 10^3/ML

## 2022-04-08 ENCOUNTER — TELEPHONE (OUTPATIENT)
Dept: FAMILY MEDICINE CLINIC | Age: 42
End: 2022-04-08

## 2022-04-08 NOTE — TELEPHONE ENCOUNTER
Called patient and left a message to call the office. ----- Message from Adolph Mendosa sent at 4/7/2022  1:04 PM EDT -----  Subject: Message to Provider    QUESTIONS  Information for Provider? Fax Med Recs to? Tisha Granados  [phone]   Wellness Check  Lab Results   ---------------------------------------------------------------------------  --------------  CALL BACK INFO  What is the best way for the office to contact you? OK to leave message on   voicemail  Preferred Call Back Phone Number? [phone]  ---------------------------------------------------------------------------  --------------  SCRIPT ANSWERS  Relationship to Patient?  Self

## 2022-04-14 DIAGNOSIS — Z00.00 WELLNESS EXAMINATION: ICD-10-CM
